# Patient Record
Sex: FEMALE | Race: OTHER
[De-identification: names, ages, dates, MRNs, and addresses within clinical notes are randomized per-mention and may not be internally consistent; named-entity substitution may affect disease eponyms.]

---

## 2018-07-22 ENCOUNTER — HOSPITAL ENCOUNTER (INPATIENT)
Dept: HOSPITAL 72 - EMR | Age: 73
LOS: 2 days | Discharge: HOME | DRG: 305 | End: 2018-07-24
Payer: MEDICARE

## 2018-07-22 VITALS — DIASTOLIC BLOOD PRESSURE: 69 MMHG | SYSTOLIC BLOOD PRESSURE: 165 MMHG

## 2018-07-22 VITALS — DIASTOLIC BLOOD PRESSURE: 72 MMHG | SYSTOLIC BLOOD PRESSURE: 136 MMHG

## 2018-07-22 VITALS — BODY MASS INDEX: 25.76 KG/M2 | HEIGHT: 62 IN | WEIGHT: 140 LBS

## 2018-07-22 VITALS — SYSTOLIC BLOOD PRESSURE: 147 MMHG | DIASTOLIC BLOOD PRESSURE: 69 MMHG

## 2018-07-22 DIAGNOSIS — M25.512: ICD-10-CM

## 2018-07-22 DIAGNOSIS — R07.9: ICD-10-CM

## 2018-07-22 DIAGNOSIS — Z91.19: ICD-10-CM

## 2018-07-22 DIAGNOSIS — N39.0: ICD-10-CM

## 2018-07-22 DIAGNOSIS — M48.02: ICD-10-CM

## 2018-07-22 DIAGNOSIS — I16.0: Primary | ICD-10-CM

## 2018-07-22 LAB
ADD MANUAL DIFF: NO
ALBUMIN SERPL-MCNC: 4.1 G/DL (ref 3.4–5)
ALBUMIN/GLOB SERPL: 1 {RATIO} (ref 1–2.7)
ALP SERPL-CCNC: 74 U/L (ref 46–116)
ALT SERPL-CCNC: 25 U/L (ref 12–78)
ANION GAP SERPL CALC-SCNC: 15 MMOL/L (ref 5–15)
APPEARANCE UR: CLEAR
APTT PPP: (no result) S
AST SERPL-CCNC: 27 U/L (ref 15–37)
BASOPHILS NFR BLD AUTO: 0.8 % (ref 0–2)
BILIRUB SERPL-MCNC: 0.5 MG/DL (ref 0.2–1)
BUN SERPL-MCNC: 14 MG/DL (ref 7–18)
CALCIUM SERPL-MCNC: 9.2 MG/DL (ref 8.5–10.1)
CHLORIDE SERPL-SCNC: 102 MMOL/L (ref 98–107)
CK MB SERPL-MCNC: 1.3 NG/ML (ref 0–3.6)
CK SERPL-CCNC: 147 U/L (ref 26–308)
CO2 SERPL-SCNC: 19 MMOL/L (ref 21–32)
CREAT SERPL-MCNC: 1 MG/DL (ref 0.55–1.3)
EOSINOPHIL NFR BLD AUTO: 0.2 % (ref 0–3)
ERYTHROCYTE [DISTWIDTH] IN BLOOD BY AUTOMATED COUNT: 10.8 % (ref 11.6–14.8)
GLOBULIN SER-MCNC: 4 G/DL
GLUCOSE UR STRIP-MCNC: NEGATIVE MG/DL
HCT VFR BLD CALC: 41.3 % (ref 37–47)
HGB BLD-MCNC: 13.8 G/DL (ref 12–16)
KETONES UR QL STRIP: NEGATIVE
LEUKOCYTE ESTERASE UR QL STRIP: (no result)
LYMPHOCYTES NFR BLD AUTO: 18.1 % (ref 20–45)
MCV RBC AUTO: 92 FL (ref 80–99)
MONOCYTES NFR BLD AUTO: 5.4 % (ref 1–10)
NEUTROPHILS NFR BLD AUTO: 75.5 % (ref 45–75)
NITRITE UR QL STRIP: NEGATIVE
PH UR STRIP: 7 [PH] (ref 4.5–8)
PLATELET # BLD: 247 K/UL (ref 150–450)
POTASSIUM SERPL-SCNC: 3.8 MMOL/L (ref 3.5–5.1)
PROT UR QL STRIP: (no result)
RBC # BLD AUTO: 4.51 M/UL (ref 4.2–5.4)
SODIUM SERPL-SCNC: 136 MMOL/L (ref 136–145)
SP GR UR STRIP: 1.01 (ref 1–1.03)
UROBILINOGEN UR-MCNC: NORMAL MG/DL (ref 0–1)
WBC # BLD AUTO: 10.8 K/UL (ref 4.8–10.8)

## 2018-07-22 PROCEDURE — 82550 ASSAY OF CK (CPK): CPT

## 2018-07-22 PROCEDURE — 80061 LIPID PANEL: CPT

## 2018-07-22 PROCEDURE — 86140 C-REACTIVE PROTEIN: CPT

## 2018-07-22 PROCEDURE — 84439 ASSAY OF FREE THYROXINE: CPT

## 2018-07-22 PROCEDURE — 71045 X-RAY EXAM CHEST 1 VIEW: CPT

## 2018-07-22 PROCEDURE — 93880 EXTRACRANIAL BILAT STUDY: CPT

## 2018-07-22 PROCEDURE — 84484 ASSAY OF TROPONIN QUANT: CPT

## 2018-07-22 PROCEDURE — 81003 URINALYSIS AUTO W/O SCOPE: CPT

## 2018-07-22 PROCEDURE — 85730 THROMBOPLASTIN TIME PARTIAL: CPT

## 2018-07-22 PROCEDURE — 85025 COMPLETE CBC W/AUTO DIFF WBC: CPT

## 2018-07-22 PROCEDURE — 94664 DEMO&/EVAL PT USE INHALER: CPT

## 2018-07-22 PROCEDURE — 82553 CREATINE MB FRACTION: CPT

## 2018-07-22 PROCEDURE — 93306 TTE W/DOPPLER COMPLETE: CPT

## 2018-07-22 PROCEDURE — 36415 COLL VENOUS BLD VENIPUNCTURE: CPT

## 2018-07-22 PROCEDURE — 80053 COMPREHEN METABOLIC PANEL: CPT

## 2018-07-22 PROCEDURE — 72141 MRI NECK SPINE W/O DYE: CPT

## 2018-07-22 PROCEDURE — 84443 ASSAY THYROID STIM HORMONE: CPT

## 2018-07-22 PROCEDURE — 85610 PROTHROMBIN TIME: CPT

## 2018-07-22 PROCEDURE — 93005 ELECTROCARDIOGRAM TRACING: CPT

## 2018-07-22 RX ADMIN — HEPARIN SODIUM SCH UNITS: 5000 INJECTION INTRAVENOUS; SUBCUTANEOUS at 20:52

## 2018-07-22 NOTE — EMERGENCY ROOM REPORT
History of Present Illness


General


Chief Complaint:  General Complaint


Source:  Patient





Present Illness


HPI


Patient present with complaints of left upper chest pain and shoulder pain


Ongoing for the past 2 days





Denies any vomiting or diarrhea patient also reports that she is taking a lot 

of medications for pain and high blood pressure





Denies any recent travel denies any pleurisy or shortness of breath denies any 

back or flank pain





Denies any recent trauma pain is a sharp pain at times aching


Allergies:  


Coded Allergies:  


     No Known Allergies (Unverified , 7/22/18)





Patient History


Past Medical History:  see triage record


Pertinent Family History:  none


Reviewed Nursing Documentation:  PMH: Agreed; PSxH: Agreed





Nursing Documentation-PMH


Hx Hypertension:  Yes - high cholesterol





Review of Systems


All Other Systems:  negative except mentioned in HPI





Physical Exam





Vital Signs








  Date Time  Temp Pulse Resp B/P (MAP) Pulse Ox O2 Delivery O2 Flow Rate FiO2


 


7/22/18 13:36 98.3 84 18 136/72 98 Room Air  





 98.2       








Sp02 EP Interpretation:  reviewed, normal


General Appearance:  well appearing, no apparent distress


Head:  normocephalic, atraumatic


Eyes:  bilateral eye PERRL, bilateral eye EOMI


ENT:  hearing grossly normal, normal pharynx, TMs + canals normal, uvula midline


Neck:  full range of motion, supple, no meningismus, no bony tend


Respiratory:  lungs clear, normal breath sounds, no rhonchi, no respiratory 

distress, no retraction, no accessory muscle use


Cardiovascular #1:  normal peripheral pulses, regular rate, rhythm, no edema, 

no gallop, no JVD, no murmur


Gastrointestinal:  normal bowel sounds, non tender, soft, no mass, no 

organomegaly, non-distended, no guarding, no hernia, no pulsatile mass, no 

rebound


Genitourinary:  no CVA tenderness


Musculoskeletal:  other - Patient does have some discomfort at the left 

shoulder on flexion of the left arm, eversion of the upper arm


Neurologic:  oriented x3, responsive, CNs III-XII nml as tested, motor strength/

tone normal, sensory intact


Psychiatric:  mood/affect normal


Skin:  normal color, no rash, warm/dry, palpation normal


Lymphatic:  normal inspection, no adenopathy





Medical Decision Making


Diagnostic Impression:  


 Primary Impression:  


 ACS (acute coronary syndrome)


 Additional Impression:  


 Hypertensive urgency, malignant


ER Course


Patient is a fairly complex patient with multiple differential to consideration 

including but not limited to cardiac cardiopulmonary and vascular emergencies





Patient's initial EKG along with blood work are at baseline levels





Patient's pain has improved somewhat however given the location of the 

radiation there is concern for possible ACS and patient amended for further 

inpatient care





Labs








Test


  7/22/18


14:00 7/22/18


14:15 7/22/18


18:40 7/23/18


06:40


 


White Blood Count


  10.8 K/UL


(4.8-10.8) 


  


  7.4 K/UL


(4.8-10.8)


 


Red Blood Count


  4.51 M/UL


(4.20-5.40) 


  


  4.54 M/UL


(4.20-5.40)


 


Hemoglobin


  13.8 G/DL


(12.0-16.0) 


  


  14.2 G/DL


(12.0-16.0)


 


Hematocrit


  41.3 %


(37.0-47.0) 


  


  40.8 %


(37.0-47.0)


 


Mean Corpuscular Volume 92 FL (80-99)    90 FL (80-99) 


 


Mean Corpuscular Hemoglobin


  30.5 PG


(27.0-31.0) 


  


  31.3 PG


(27.0-31.0)


 


Mean Corpuscular Hemoglobin


Concent 33.3 G/DL


(32.0-36.0) 


  


  34.9 G/DL


(32.0-36.0)


 


Red Cell Distribution Width


  10.8 %


(11.6-14.8) 


  


  10.7 %


(11.6-14.8)


 


Platelet Count


  247 K/UL


(150-450) 


  


  241 K/UL


(150-450)


 


Mean Platelet Volume


  7.1 FL


(6.5-10.1) 


  


  6.7 FL


(6.5-10.1)


 


Neutrophils (%) (Auto)


  75.5 %


(45.0-75.0) 


  


  62.3 %


(45.0-75.0)


 


Lymphocytes (%) (Auto)


  18.1 %


(20.0-45.0) 


  


  29.8 %


(20.0-45.0)


 


Monocytes (%) (Auto)


  5.4 %


(1.0-10.0) 


  


  6.7 %


(1.0-10.0)


 


Eosinophils (%) (Auto)


  0.2 %


(0.0-3.0) 


  


  0.6 %


(0.0-3.0)


 


Basophils (%) (Auto)


  0.8 %


(0.0-2.0) 


  


  0.7 %


(0.0-2.0)


 


Sodium Level


  136 MMOL/L


(136-145) 


  


  


 


 


Potassium Level


  3.8 MMOL/L


(3.5-5.1) 


  


  


 


 


Chloride Level


  102 MMOL/L


() 


  


  


 


 


Carbon Dioxide Level


  19 MMOL/L


(21-32) 


  


  


 


 


Anion Gap


  15 mmol/L


(5-15) 


  


  


 


 


Blood Urea Nitrogen


  14 mg/dL


(7-18) 


  


  


 


 


Creatinine


  1.0 MG/DL


(0.55-1.30) 


  


  


 


 


Estimat Glomerular Filtration


Rate 54.8 mL/min


(>60) 


  


  


 


 


Glucose Level


  202 MG/DL


() 


  


  


 


 


Calcium Level


  9.2 MG/DL


(8.5-10.1) 


  


  


 


 


Total Bilirubin


  0.5 MG/DL


(0.2-1.0) 


  


  


 


 


Aspartate Amino Transf


(AST/SGOT) 27 U/L (15-37) 


  


  


  


 


 


Alanine Aminotransferase


(ALT/SGPT) 25 U/L (12-78) 


  


  


  


 


 


Alkaline Phosphatase


  74 U/L


() 


  


  


 


 


Total Creatine Kinase


  147 U/L


() 


  


  


 


 


Creatine Kinase MB


  1.3 NG/ML


(0.0-3.6) 


  


  


 


 


Creatine Kinase MB Relative


Index 0.8 


  


  


  


 


 


Troponin I


  0.000 ng/mL


(0.000-0.056) 


  0.010 ng/mL


(0.000-0.056) 0.000 ng/mL


(0.000-0.056)


 


Total Protein


  8.1 G/DL


(6.4-8.2) 


  


  


 


 


Albumin


  4.1 G/DL


(3.4-5.0) 


  


  


 


 


Globulin 4.0 g/dL    


 


Albumin/Globulin Ratio 1.0 (1.0-2.7)    


 


Thyroid Stimulating Hormone


(TSH) 1.488 uiU/mL


(0.358-3.740) 


  


  1.231 uiU/mL


(0.358-3.740)


 


Free Thyroxine


  1.18 NG/DL


(0.76-1.46) 


  


  


 


 


Urine Color  Pale yellow   


 


Urine Appearance  Clear   


 


Urine pH  7 (4.5-8.0)   


 


Urine Specific Gravity


  


  1.010


(1.005-1.035) 


  


 


 


Urine Protein  1+ (NEGATIVE)   


 


Urine Glucose (UA)


  


  Negative


(NEGATIVE) 


  


 


 


Urine Ketones


  


  Negative


(NEGATIVE) 


  


 


 


Urine Occult Blood  2+ (NEGATIVE)   


 


Urine Nitrite


  


  Negative


(NEGATIVE) 


  


 


 


Urine Bilirubin


  


  Negative


(NEGATIVE) 


  


 


 


Urine Urobilinogen


  


  Normal MG/DL


(0.0-1.0) 


  


 


 


Urine Leukocyte Esterase  3+ (NEGATIVE)   


 


Urine RBC


  


  2-4 /HPF (0 -


2) 


  


 


 


Urine WBC


  


  5-10 /HPF (0 -


2) 


  


 


 


Urine Squamous Epithelial


Cells 


  Few /LPF


(NONE/OCC) 


  


 


 


Urine Bacteria


  


  Few /HPF


(NONE) 


  


 


 


Prothrombin Time


  


  


  


  11.0 SEC


(9.30-11.50)


 


Prothromb Time International


Ratio 


  


  


  1.0 (0.9-1.1) 


 


 


Activated Partial


Thromboplast Time 


  


  


  29 SEC (23-33) 


 


 


C-Reactive Protein,


Quantitative 


  


  


  < 0.4 mg/dL


(0.00-0.90)


 


Triglycerides Level


  


  


  


  73 MG/DL


()


 


Cholesterol Level


  


  


  


  171 MG/DL (<


200)


 


LDL Cholesterol


  


  


  


  104 mg/dL


(<100)


 


HDL Cholesterol


  


  


  


  67 MG/DL


(40-60)


 


Cholesterol/HDL Ratio    2.6 (3.3-4.4) 








EKG Diagnostic Results


Rate:  normal


Rhythm:  NSR


ST Segments:  no acute changes





Rhythm Strip Diag. Results


EP Interpretation:  yes


Rate:  67


Rhythm:  NSR, no PVC's, no ectopy





Chest X-Ray Diagnostic Results


Chest X-Ray Diagnostic Results :  


   Chest X-Ray Ordered:  Yes


   # of Views/Limited/Complete:  1 View


   Indication:  Chest Pain


   EP Interpretation:  Yes


   Interpretation:  no consolidation, no effusion, no pneumothorax


   Impression:  No acute disease


   Electronically Signed by:  DO Tyree Quick Vital Signs








  Date Time  Temp Pulse Resp B/P (MAP) Pulse Ox O2 Delivery O2 Flow Rate FiO2


 


7/22/18 15:34 98.2 83 17 165/69 98 Room Air  





 98.2       








Status:  improved


Disposition:  ADMITTED AS INPATIENT


Condition:  Serious


Referrals:  


NON PHYSICIAN (PCP)











Brandy Tillman DO Jul 22, 2018 16:00

## 2018-07-23 VITALS — SYSTOLIC BLOOD PRESSURE: 150 MMHG | DIASTOLIC BLOOD PRESSURE: 70 MMHG

## 2018-07-23 VITALS — DIASTOLIC BLOOD PRESSURE: 63 MMHG | SYSTOLIC BLOOD PRESSURE: 125 MMHG

## 2018-07-23 VITALS — SYSTOLIC BLOOD PRESSURE: 170 MMHG | DIASTOLIC BLOOD PRESSURE: 77 MMHG

## 2018-07-23 VITALS — SYSTOLIC BLOOD PRESSURE: 128 MMHG | DIASTOLIC BLOOD PRESSURE: 70 MMHG

## 2018-07-23 LAB
ADD MANUAL DIFF: NO
APTT BLD: 29 SEC (ref 23–33)
BASOPHILS NFR BLD AUTO: 0.7 % (ref 0–2)
CHOLEST SERPL-MCNC: 171 MG/DL (ref ?–200)
EOSINOPHIL NFR BLD AUTO: 0.6 % (ref 0–3)
ERYTHROCYTE [DISTWIDTH] IN BLOOD BY AUTOMATED COUNT: 10.7 % (ref 11.6–14.8)
HCT VFR BLD CALC: 40.8 % (ref 37–47)
HDLC SERPL-MCNC: 67 MG/DL (ref 40–60)
HGB BLD-MCNC: 14.2 G/DL (ref 12–16)
INR PPP: 1 (ref 0.9–1.1)
LYMPHOCYTES NFR BLD AUTO: 29.8 % (ref 20–45)
MCV RBC AUTO: 90 FL (ref 80–99)
MONOCYTES NFR BLD AUTO: 6.7 % (ref 1–10)
NEUTROPHILS NFR BLD AUTO: 62.3 % (ref 45–75)
PLATELET # BLD: 241 K/UL (ref 150–450)
RBC # BLD AUTO: 4.54 M/UL (ref 4.2–5.4)
TRIGL SERPL-MCNC: 73 MG/DL (ref 30–150)
WBC # BLD AUTO: 7.4 K/UL (ref 4.8–10.8)

## 2018-07-23 RX ADMIN — HEPARIN SODIUM SCH UNITS: 5000 INJECTION INTRAVENOUS; SUBCUTANEOUS at 20:15

## 2018-07-23 RX ADMIN — ENALAPRILAT PRN MG: 1.25 INJECTION, SOLUTION INTRAVENOUS at 21:08

## 2018-07-23 RX ADMIN — HEPARIN SODIUM SCH UNITS: 5000 INJECTION INTRAVENOUS; SUBCUTANEOUS at 09:31

## 2018-07-23 NOTE — CONSULTATION
History of Present Illness


General


Date patient seen:  Jul 23, 2018


Chief Complaint:  General Complaint





Present Illness


HPI


72 year old female with hx of HTN, was brought in by paramedics because of 

dizziness and anxiety, Apparently she tool her morning BP meds twice and got 

anxious. then she had dry mouth and dizzy and called the paramedics. She is 

admitted to telemetry. Her major concern is her Left shoulder pain.


Allergies:  


Coded Allergies:  


     No Known Allergies (Unverified , 7/22/18)





Medication History


Scheduled


Aspirin* (Aspir 81*), 81 MG ORAL DAILY, (Reported)


Diclofenac Sod* (Voltaren*), 50 MG ORAL THREE TIMES A DAY, (Reported)


Hydralazine Hcl* (Hydralazine Hcl*), 10 MG ORAL EVERY 8 HOURS, (Reported)


Levothyroxine Sodium* (Levothyroxine Sodium*), 250 MCG ORAL DAILY, (Reported)


Nifedipine (Nifedipine*), 60 MG ORAL DAILY, (Reported)


Omeprazole Magnesium (Prilosec Otc), 40 MG ORAL DAILY, (Reported)


Oxybutynin Chloride (Oxybutynin Chloride Er), 5 MG PO DAILY, (Reported)





Patient History


Healthcare decision maker





Resuscitation status


Full Code


Advanced Directive on File








Past Medical/Surgical History


Past Medical/Surgical History:  


(1) Hypertension





Review of Systems


All Other Systems:  negative except mentioned in HPI





Physical Exam


General Appearance:  WD/WN, no apparent distress


Lines, tubes and drains:  peripheral, central line, endotracheal tube


HEENT:  normocephalic, atraumatic


Neck:  non-tender, supple


Respiratory/Chest:  chest wall non-tender, normal breath sounds


Cardiovascular/Chest:  normal peripheral pulses, normal rate





Last 24 Hour Vital Signs








  Date Time  Temp Pulse Resp B/P (MAP) Pulse Ox O2 Delivery O2 Flow Rate FiO2


 


7/23/18 09:00      Room Air  


 


7/23/18 08:00  68      


 


7/23/18 08:00 97.8 70 18 125/63 (83) 96   





 97.8       


 


7/23/18 04:00  59      


 


7/22/18 21:00      Room Air  


 


7/22/18 20:00  68      


 


7/22/18 16:50      Room Air  


 


7/22/18 16:45 98.2 92 22 147/69 98 Room Air  





 98.2       


 


7/22/18 16:32 98.2 92 22 147/69 98 Room Air  





 98.2       


 


7/22/18 15:34 98.2 83 17 165/69 98 Room Air  





 98.2       


 


7/22/18 13:46 98.2 100 18 136/72 98 Room Air  





 98.2       


 


7/22/18 13:36 98.3 84 18 136/72 98 Room Air  





 98.2       

















Intake and Output  


 


 7/22/18 7/23/18





 19:00 07:00


 


Intake Total  250 ml


 


Output Total 0 ml 


 


Balance 0 ml 250 ml


 


  


 


Intake Oral  250 ml


 


Output Urine Total 0 ml 


 


# Voids 2 3











Laboratory Tests








Test


  7/22/18


14:00 7/22/18


14:15 7/22/18


18:40 7/23/18


06:40


 


White Blood Count


  10.8 K/UL


(4.8-10.8) 


  


  7.4 K/UL


(4.8-10.8)


 


Red Blood Count


  4.51 M/UL


(4.20-5.40) 


  


  4.54 M/UL


(4.20-5.40)


 


Hemoglobin


  13.8 G/DL


(12.0-16.0) 


  


  14.2 G/DL


(12.0-16.0)


 


Hematocrit


  41.3 %


(37.0-47.0) 


  


  40.8 %


(37.0-47.0)


 


Mean Corpuscular Volume 92 FL (80-99)     90 FL (80-99)  


 


Mean Corpuscular Hemoglobin


  30.5 PG


(27.0-31.0) 


  


  31.3 PG


(27.0-31.0)  H


 


Mean Corpuscular Hemoglobin


Concent 33.3 G/DL


(32.0-36.0) 


  


  34.9 G/DL


(32.0-36.0)


 


Red Cell Distribution Width


  10.8 %


(11.6-14.8)  L 


  


  10.7 %


(11.6-14.8)  L


 


Platelet Count


  247 K/UL


(150-450) 


  


  241 K/UL


(150-450)


 


Mean Platelet Volume


  7.1 FL


(6.5-10.1) 


  


  6.7 FL


(6.5-10.1)


 


Neutrophils (%) (Auto)


  75.5 %


(45.0-75.0)  H 


  


  62.3 %


(45.0-75.0)


 


Lymphocytes (%) (Auto)


  18.1 %


(20.0-45.0)  L 


  


  29.8 %


(20.0-45.0)


 


Monocytes (%) (Auto)


  5.4 %


(1.0-10.0) 


  


  6.7 %


(1.0-10.0)


 


Eosinophils (%) (Auto)


  0.2 %


(0.0-3.0) 


  


  0.6 %


(0.0-3.0)


 


Basophils (%) (Auto)


  0.8 %


(0.0-2.0) 


  


  0.7 %


(0.0-2.0)


 


Sodium Level


  136 MMOL/L


(136-145) 


  


  


 


 


Potassium Level


  3.8 MMOL/L


(3.5-5.1) 


  


  


 


 


Chloride Level


  102 MMOL/L


() 


  


  


 


 


Carbon Dioxide Level


  19 MMOL/L


(21-32)  L 


  


  


 


 


Anion Gap


  15 mmol/L


(5-15) 


  


  


 


 


Blood Urea Nitrogen


  14 mg/dL


(7-18) 


  


  


 


 


Creatinine


  1.0 MG/DL


(0.55-1.30) 


  


  


 


 


Estimat Glomerular Filtration


Rate 54.8 mL/min


(>60) 


  


  


 


 


Glucose Level


  202 MG/DL


()  H 


  


  


 


 


Calcium Level


  9.2 MG/DL


(8.5-10.1) 


  


  


 


 


Total Bilirubin


  0.5 MG/DL


(0.2-1.0) 


  


  


 


 


Aspartate Amino Transf


(AST/SGOT) 27 U/L (15-37)


  


  


  


 


 


Alanine Aminotransferase


(ALT/SGPT) 25 U/L (12-78)


  


  


  


 


 


Alkaline Phosphatase


  74 U/L


() 


  


  


 


 


Total Creatine Kinase


  147 U/L


() 


  


  


 


 


Creatine Kinase MB


  1.3 NG/ML


(0.0-3.6) 


  


  


 


 


Creatine Kinase MB Relative


Index 0.8  


  


  


  


 


 


Troponin I


  0.000 ng/mL


(0.000-0.056) 


  0.010 ng/mL


(0.000-0.056) 0.000 ng/mL


(0.000-0.056)


 


Total Protein


  8.1 G/DL


(6.4-8.2) 


  


  


 


 


Albumin


  4.1 G/DL


(3.4-5.0) 


  


  


 


 


Globulin 4.0 g/dL     


 


Albumin/Globulin Ratio 1.0 (1.0-2.7)     


 


Thyroid Stimulating Hormone


(TSH) 1.488 uiU/mL


(0.358-3.740) 


  


  1.231 uiU/mL


(0.358-3.740)


 


Free Thyroxine


  1.18 NG/DL


(0.76-1.46) 


  


  


 


 


Urine Color  Pale yellow    


 


Urine Appearance  Clear    


 


Urine pH  7 (4.5-8.0)    


 


Urine Specific Gravity


  


  1.010


(1.005-1.035) 


  


 


 


Urine Protein


  


  1+ (NEGATIVE)


H 


  


 


 


Urine Glucose (UA)


  


  Negative


(NEGATIVE) 


  


 


 


Urine Ketones


  


  Negative


(NEGATIVE) 


  


 


 


Urine Occult Blood


  


  2+ (NEGATIVE)


H 


  


 


 


Urine Nitrite


  


  Negative


(NEGATIVE) 


  


 


 


Urine Bilirubin


  


  Negative


(NEGATIVE) 


  


 


 


Urine Urobilinogen


  


  Normal MG/DL


(0.0-1.0) 


  


 


 


Urine Leukocyte Esterase


  


  3+ (NEGATIVE)


H 


  


 


 


Urine RBC


  


  2-4 /HPF (0 -


2)  H 


  


 


 


Urine WBC


  


  5-10 /HPF (0 -


2)  H 


  


 


 


Urine Squamous Epithelial


Cells 


  Few /LPF


(NONE/OCC) 


  


 


 


Urine Bacteria


  


  Few /HPF


(NONE) 


  


 


 


Prothrombin Time


  


  


  


  11.0 SEC


(9.30-11.50)


 


Prothromb Time International


Ratio 


  


  


  1.0 (0.9-1.1)  


 


 


Activated Partial


Thromboplast Time 


  


  


  29 SEC (23-33)


 


 


C-Reactive Protein,


Quantitative 


  


  


  < 0.4 mg/dL


(0.00-0.90)


 


Triglycerides Level


  


  


  


  73 MG/DL


()


 


Cholesterol Level


  


  


  


  171 MG/DL (<


200)


 


LDL Cholesterol


  


  


  


  104 mg/dL


(<100)  H


 


HDL Cholesterol


  


  


  


  67 MG/DL


(40-60)  H


 


Cholesterol/HDL Ratio


  


  


  


  2.6 (3.3-4.4)


L








Height (Feet):  5


Height (Inches):  2.00


Weight (Pounds):  140


Medications





Current Medications








 Medications


  (Trade)  Dose


 Ordered  Sig/Chely


 Route


 PRN Reason  Start Time


 Stop Time Status Last Admin


Dose Admin


 


 Acetaminophen


  (Tylenol)  650 mg  Q4H  PRN


 ORAL


 FEVER  7/22/18 18:00


 8/21/18 17:59  7/23/18 09:29


 


 


 Albuterol/


 Ipratropium


  (Albuterol/


 Ipratropium)  3 ml  Q4H  PRN


 HHN


 Shortness of Breath  7/22/18 18:00


 7/27/18 17:59   


 


 


 Diltiazem HCl


  (Cardizem)  10 mg  Q1H  PRN


 IV


 heart rate more than 120  7/22/18 18:00


 8/21/18 17:59   


 


 


 Enalaprilat


  (Vasotec)  2.5 mg  Q6H  PRN


 IV


 sbp more than 160  7/22/18 18:00


 8/21/18 17:59   


 


 


 Heparin Sodium


  (Porcine)


  (Heparin 5000


 units/ml)  5,000 units  EVERY 12  HOURS


 SUBQ


   7/22/18 21:00


 8/21/18 20:59  7/23/18 09:31


 


 


 Labetalol HCl


  (Normodyne)  20 mg  Q1H  PRN


 IV


 sbp more than 160  7/22/18 18:00


 8/21/18 17:59   


 


 


 Nitroglycerin


  (Ntg)  0.4 mg  Q5M  PRN


 SL


 Prn Chest Pain  7/22/18 18:00


 8/21/18 17:59   


 


 


 Ondansetron HCl


  (Zofran)  4 mg  Q6H  PRN


 IVP


 Nausea & Vomiting  7/22/18 18:00


 8/21/18 17:59   


 


 


 Pantoprazole


  (Protonix)  40 mg  DAILY


 ORAL


   7/23/18 09:00


 8/22/18 08:59  7/23/18 10:20


 


 


 Polyethylene


 Glycol


  (Miralax)  17 gm  DAILYPRN  PRN


 ORAL


 Constipation  7/22/18 18:00


 8/21/18 17:59   


 


 


 Temazepam


  (Restoril)  15 mg  HSPRN  PRN


 ORAL


 Insomnia  7/22/18 21:00


 7/29/18 20:59   


 











Assessment/Plan


Problem List:  


(1) Dizziness


ICD Codes:  R42 - Dizziness and giddiness


SNOMED:  741163588, 279292201


(2) Shoulder pain


ICD Codes:  M25.519 - Pain in unspecified shoulder


SNOMED:  01818578


(3) Hypertension


ICD Codes:  I10 - Essential (primary) hypertension


SNOMED:  84597651


(4) Noncompliance


ICD Codes:  Z91.19 - Patient's noncompliance with other medical treatment and 

regimen


SNOMED:  6429258


Assessment/Plan


monitor in telemetry


echo


ortho evaluation


symptomatic evaluation


doppler of carotid artery


dvt prophylaxis.











Magdy Chambers MD Jul 23, 2018 12:09

## 2018-07-23 NOTE — DIAGNOSTIC IMAGING REPORT
Indication: Pain

 

Technique: XRAY Shoulder Compl L

 

Comparison: None

 

Findings: No evidence of acute fracture or dislocation. Imaged portions of the left

lung clear. No radiopaque foreign body identified.

 

Impression: No acute fracture or dislocation. 

 

Consider MRI of the left shoulder on a nonemergent basis as clinically indicated if

there is continued shoulder pain.

## 2018-07-23 NOTE — CARDIOLOGY REPORT
--------------- APPROVED REPORT --------------





EXAM: Two-dimensional and M-mode echocardiogram with Doppler and color 

Doppler.



INDICATION

LV FUNCTION 



M-Mode DIMENSIONS 

IVSd1.2 (0.7-1.1cm)Left Atrium (MM)3.1 (1.6-4.0cm)

LVDd5.4 (3.5-5.6cm)Aortic Root2.3 (2.0-3.7cm)

PWd1.3 (0.7-1.1cm)Aortic Cusp Exc.1.7 (1.5-2.0cm)

IVSs1.7 cm

LVDs3.4 (2.5-4.0cm)

PWs1.2 cm





Technically difficult study due to poor acoustical windows.

Normal left ventricular chamber size, systolic function and wall motion to extent 

visualized.

Left ventricular ejection fraction estimated to be  55-60 %.

No evidence of left ventricular hypertrophy .

No evidence of  pericardial effusion. 

All other cardiac chamber sizes are within normal limits. 

Focal aortic valve sclerosis with adequate cusp excursion.

Thickened mitral valve leaflets with normal excursion.

Mitral annulus and aortic root calcification.

Pulmonic valve not well visualized.

Normal tricuspid valve structure. 

IVC at normal size with physiologic collapse .



A  color flow and spectral Doppler study was performed and revealed:

Mild aortic regurgitation.

Trace  mitral regurgitation.

Mitral diastolic velocities suggest reduced left ventricular relaxation c/w mild LV 

diastolic dysfunction (Grade I ). 

Mild tricuspid regurgitation.

Tricuspid  systolic velocities suggests peak right ventricular systolic pressure of  

34mmH.

No Pulmonic regurgitation present.

## 2018-07-23 NOTE — DIAGNOSTIC IMAGING REPORT
Indication: Chest pain

 

Technique: XRAY Chest 1v

 

Comparison: None

 

Findings: Cardiac leads overlie the chest. Heart size and mediastinal contours are

within normal limits given technique. There is no focal consolidation, pneumothorax

or pleural effusion. Degenerative change of the spine. Osseous structures demonstrate

no acute abnormality.

 

Impression: No radiographic evidence of acute cardiopulmonary disease.

## 2018-07-23 NOTE — HISTORY AND PHYSICAL REPORT
DATE OF ADMISSION:  07/22/2018



CONSULTANTS:

1. Dr. Crews.

2. Magdy Chambers M.D.

3. Carlos Campbell M.D.



CHIEF COMPLAINT:  Headache, hypertensive urgency, and left shoulder pain.



BRIEF HISTORY:  The patient is a 72-year-old female, who lives at home,

presents with headache, slight weakness, also left shoulder pain, sent to

[place], diagnosed with hypertensive urgency, admitted to telemetry for

further care.  Currently calm in bed.  No complaint.



PAST MEDICAL HISTORY:  Hypertension.



PAST SURGICAL HISTORY:  None.



MEDICATIONS:  Protonix, Restoril, heparin, albuterol, _____, MiraLAX,

Zofran, Vasotec, Cardizem, and Normodyne.



ALLERGIES:  Denies.



SOCIAL HISTORY:  No smoking.  No alcohol.  No intravenous drug

abuse.



REVIEW OF SYSTEMS:  No chest pain.  Slight short of breath.  Slight nausea.

No vomiting or diarrhea.



PHYSICAL EXAMINATION:

GENERAL:  Calm in bed, oriented x3, no acute distress.

VITAL SIGNS:  Temperature is 97 degrees, pulse 70, respiratory rate 16, and

blood pressure now is 123/63.

CARDIOVASCULAR:  No murmur.

LUNGS:  Distant and clear.

ABDOMEN:  Bowel sounds positive.  Nontender.  Nondistended.

EXTREMITIES:  No cyanosis or edema.

NEUROLOGIC:  The patient moves all extremities, slightly weak.



LABORATORY AND DIAGNOSTIC DATA:  CBC is normal.  BMP actually shows

troponin 0.00, 0.01 and 0.00.  Cholesterol, , and HDL 67, and total

is 73.  INR is 1.0 and PTT is 29.  Urinalysis shows 3+ leukocyte

esterase.



ASSESSMENT:

1. Headache.

2. Hypertensive urgency.

3. Urinary tract infection.

4. Left shoulder pain.



PLAN:

1. Continue previous medications.

2. Blood pressure control.

3. Antibiotics per Infectious Disease.

4. Pain control.

5. Orthopedic and cardiac followup.

6. The patient per insurance will be transferred shortly to assigned

hospital.









  ______________________________________________

  Jose Solano D.O.





DR:  AGUSTIN

D:  07/23/2018 14:17

T:  07/23/2018 21:58

JOB#:  [id number]

CC:

## 2018-07-24 VITALS — SYSTOLIC BLOOD PRESSURE: 148 MMHG | DIASTOLIC BLOOD PRESSURE: 54 MMHG

## 2018-07-24 VITALS — SYSTOLIC BLOOD PRESSURE: 155 MMHG | DIASTOLIC BLOOD PRESSURE: 75 MMHG

## 2018-07-24 VITALS — SYSTOLIC BLOOD PRESSURE: 166 MMHG | DIASTOLIC BLOOD PRESSURE: 67 MMHG

## 2018-07-24 VITALS — DIASTOLIC BLOOD PRESSURE: 72 MMHG | SYSTOLIC BLOOD PRESSURE: 170 MMHG

## 2018-07-24 VITALS — DIASTOLIC BLOOD PRESSURE: 74 MMHG | SYSTOLIC BLOOD PRESSURE: 123 MMHG

## 2018-07-24 VITALS — SYSTOLIC BLOOD PRESSURE: 160 MMHG | DIASTOLIC BLOOD PRESSURE: 74 MMHG

## 2018-07-24 RX ADMIN — HEPARIN SODIUM SCH UNITS: 5000 INJECTION INTRAVENOUS; SUBCUTANEOUS at 09:54

## 2018-07-24 RX ADMIN — ENALAPRILAT PRN MG: 1.25 INJECTION, SOLUTION INTRAVENOUS at 04:38

## 2018-07-24 NOTE — DIAGNOSTIC IMAGING REPORT
Indication: 72-year-old female inpatient with pain in the neck and left shoulder and

arm 

 

Technique: Sagittal T1 FLAIR PROPELLER, sagittal T2 PROPELLOR, sagittal STIR, axial

T2 PROPELLER, axial 3D COSMIC ASPIR images were obtained through the cervical spine

 

Comparison: none

 

Findings: Exam is limited by motion. In particular, the axial T2-weighted images

demonstrate severe motion artifact despite repeat acquisitions. This in particular

limits evaluation of spinal cord signal.

 

The bony alignment is normal. Vertebral body heights are preserved. The vertebral

body marrow signal is normal. The intrinsic cord signal is probably normal although

not well demonstrated as mentioned above

 

There is what appears to be a degenerative subchondral cyst within the tip of the

odontoid. There is some thickening of the posterior atlantoaxial ligament.

 

At C3-4, there is mild degenerative disc narrowing. There is circumferential annular

bulge which results in moderate spinal stenosis. There is probably moderate bilateral

neural foraminal stenosis.

 

At C4-5, there is mild degenerative disc narrowing. There is broad-based posterior

disc protrusion which results in moderate spinal stenosis and slight impingement on

the anterior aspect of the cord. There is moderate right and severe left neural

foraminal stenosis at this level.

 

At C5-6, there is mild degenerative disc narrowing. There is very mild

circumferential annular bulge resulting in borderline narrowing of the spinal canal,

no significant cord impingement. Severe left and mild to moderate right neural

foraminal stenosis at this level.

 

At C6-7, there is minimal circumferential annular bulge which does not significantly

narrow the spinal canal. There is mild to moderate bilateral neural foraminal

stenosis at this level.

 

At the remaining levels, no significant disc bulge or protrusion, spinal stenosis or

neural foraminal stenosis.

 

The included extra spinal soft tissues are unremarkable.

 

Impression: To limit exam, due to motion artifact

 

Moderate spinal stenosis at C3-4 and C4-5, predominantly due to disc bulges at this

these levels

 

Multilevel neural foraminal stenoses, some severe, as detailed on a level by level

basis above

 

Other degenerative changes, as detailed above

## 2018-07-24 NOTE — CONSULTATION
History of Present Illness


General


Date patient seen:  Jul 24, 2018


Time patient seen:  16:18


Chief Complaint:  General Complaint





Present Illness


HPI


72 year old  female, c/o Left  chest and shoulder pain 3/10, precordial

, pressure/sharp. She is SR on monitor at 60s, HR regular. She also has some 

anxiety and she took 2 pain pills and 2 blood pressure medication and started 

panicking because of the doubled dose. Troponin negative x3, blood pressure 

elevated. Echocardiogram Left ventricular ejection fraction estimated to be  55-

60 %.  CT spine Moderate spinal stenosis at C3-4 and C4-5, predominantly due to 

disc bulges at this these levels


Allergies:  


Coded Allergies:  


     No Known Allergies (Unverified , 7/22/18)





Medication History


Scheduled


Aspirin* (Aspir 81*), 81 MG ORAL DAILY, (Reported)


Diclofenac Sod* (Voltaren*), 50 MG ORAL THREE TIMES A DAY, (Reported)


Hydralazine Hcl* (Hydralazine Hcl*), 10 MG ORAL EVERY 8 HOURS, (Reported)


Levothyroxine Sodium* (Levothyroxine Sodium*), 250 MCG ORAL DAILY, (Reported)


Nifedipine (Nifedipine*), 60 MG ORAL DAILY, (Reported)


Omeprazole Magnesium (Prilosec Otc), 40 MG ORAL DAILY, (Reported)


Oxybutynin Chloride (Oxybutynin Chloride Er), 5 MG PO DAILY, (Reported)





Patient History


Healthcare decision maker





Resuscitation status


Full Code


Advanced Directive on File








Review of Systems


Constitutional:  Reports: no symptoms


Eye:  Reports: no symptoms


ENT:  Reports: no symptoms


Respiratory:  Reports: no symptoms


Cardiovascular:  Reports: chest pain


Gastrointestinal:  Reports: no symptoms


Genitourinary:  Reports: no symptoms


Musculoskeletal:  Reports: joint pain, joint swelling


Skin:  Reports: no symptoms


Neurological:  Reports: no symptoms


Endocrine:  Reports: no symptoms


Hematologic/Lymphatic:  Reports: no symptoms





Physical Exam


General Appearance:  no apparent distress


Lines, tubes and drains:  peripheral


HEENT:  normocephalic, atraumatic


Neck:  non-tender, normal alignment


Respiratory/Chest:  chest wall non-tender, lungs clear


Cardiovascular/Chest:  normal peripheral pulses


Abdomen:  normal bowel sounds, non tender


Extremities:  normal range of motion, non-pitting


Skin Exam:  normal pigmentation


Neurologic:  CNs II-XII grossly normal





Last 24 Hour Vital Signs








  Date Time  Temp Pulse Resp B/P (MAP) Pulse Ox O2 Delivery O2 Flow Rate FiO2


 


7/24/18 14:39  67 16   Room Air  21


 


7/24/18 12:00 98.1 53 18 166/67 (100) 98   





 98.1       


 


7/24/18 12:00  55      


 


7/24/18 09:55    155/75    


 


7/24/18 09:51  52  155/75    


 


7/24/18 09:00      Room Air  


 


7/24/18 08:00 97.5 52 18 155/75 (101) 98   





 97.5       


 


7/24/18 08:00  53      


 


7/24/18 04:38    170/72    


 


7/24/18 04:00  55      


 


7/24/18 04:00 98.0 61 16 170/72 (104) 98   





 98.0       


 


7/24/18 00:00 97.9 64 16 123/74 (90) 96   





 97.9       


 


7/24/18 00:00  60      


 


7/23/18 21:08    170/77    


 


7/23/18 21:00      Room Air  


 


7/23/18 20:00  61      


 


7/23/18 20:00 97.9 59 19 170/77 (108) 98   





 97.9       

















Intake and Output  


 


 7/23/18 7/24/18





 19:00 07:00


 


Intake Total 360 ml 480 ml


 


Balance 360 ml 480 ml


 


  


 


Intake Oral 360 ml 480 ml


 


# Voids 3 3








Height (Feet):  5


Height (Inches):  2.00


Weight (Pounds):  140


Medications





Current Medications








 Medications


  (Trade)  Dose


 Ordered  Sig/Chely


 Route


 PRN Reason  Start Time


 Stop Time Status Last Admin


Dose Admin


 


 Acetaminophen


  (Tylenol)  650 mg  Q4H  PRN


 ORAL


 FEVER  7/22/18 18:00


 8/21/18 17:59  7/24/18 04:43


 


 


 Albuterol/


 Ipratropium


  (Albuterol/


 Ipratropium)  3 ml  Q4H  PRN


 HHN


 Shortness of Breath  7/22/18 18:00


 7/27/18 17:59   


 


 


 Diltiazem HCl


  (Cardizem)  10 mg  Q1H  PRN


 IV


 heart rate more than 120  7/22/18 18:00


 8/21/18 17:59   


 


 


 Enalaprilat


  (Vasotec)  2.5 mg  Q6H  PRN


 IV


 sbp more than 160  7/22/18 18:00


 8/21/18 17:59  7/24/18 04:38


 


 


 Heparin Sodium


  (Porcine)


  (Heparin 5000


 units/ml)  5,000 units  EVERY 12  HOURS


 SUBQ


   7/22/18 21:00


 8/21/18 20:59  7/24/18 09:54


 


 


 Hydralazine HCl


  (Apresoline)  10 mg  DAILY


 ORAL


   7/24/18 09:00


 8/23/18 08:59  7/24/18 09:55


 


 


 Labetalol HCl


  (Normodyne)  20 mg  Q1H  PRN


 IV


 sbp more than 160  7/22/18 18:00


 8/21/18 17:59   


 


 


 Nifedipine


  (Procardia XL)  60 mg  DAILY


 ORAL


   7/24/18 09:00


 8/23/18 08:59  7/24/18 09:51


 


 


 Nitroglycerin


  (Ntg)  0.4 mg  Q5M  PRN


 SL


 Prn Chest Pain  7/22/18 18:00


 8/21/18 17:59   


 


 


 Ondansetron HCl


  (Zofran)  4 mg  Q6H  PRN


 IVP


 Nausea & Vomiting  7/22/18 18:00


 8/21/18 17:59   


 


 


 Pantoprazole


  (Protonix)  40 mg  DAILY


 ORAL


   7/23/18 09:00


 8/22/18 08:59  7/24/18 09:51


 


 


 Polyethylene


 Glycol


  (Miralax)  17 gm  DAILYPRN  PRN


 ORAL


 Constipation  7/22/18 18:00


 8/21/18 17:59   


 


 


 Temazepam


  (Restoril)  15 mg  HSPRN  PRN


 ORAL


 Insomnia  7/22/18 21:00


 7/29/18 20:59   


 











Assessment/Plan


Status:  stable


Assessment/Plan


Assessment


(1) Dizziness


(2) Shoulder pain


(3) Hypertension


(4) Noncompliance


(5) Chest pain


(6) Shoulder pain





Plan


ACS ruled out , no cath needed, low pre test probability


Outpatient stress test


daily aspirin


BP control with hydralazine and procardia, would change hydralazine to 

lisinopril 20 mg


MRi shoulder


ok to discharge











Victor Manuel Reyes M.D. Jul 24, 2018 16:31

## 2018-07-24 NOTE — PULMONOLOGY PROGRESS NOTE
Assessment/Plan


Problems:  


(1) Dizziness


(2) Shoulder pain


(3) Hypertension


(4) Noncompliance


Assessment/Plan


BP better controlled


xr of left shoulder not helpful


MRI of Cspine and shoulder ordered


symptomatic treatment


might go home after the MRI





Subjective


ROS Limited/Unobtainable:  No


Interval Events:  c/o neck and left shoulder pain


Allergies:  


Coded Allergies:  


     No Known Allergies (Unverified , 7/22/18)





Objective





Last 24 Hour Vital Signs








  Date Time  Temp Pulse Resp B/P (MAP) Pulse Ox O2 Delivery O2 Flow Rate FiO2


 


7/24/18 09:55    155/75    


 


7/24/18 09:51  52  155/75    


 


7/24/18 08:00 97.5 52 18 155/75 (101) 98   





 97.5       


 


7/24/18 04:38    170/72    


 


7/24/18 04:00  55      


 


7/24/18 04:00 98.0 61 16 170/72 (104) 98   





 98.0       


 


7/24/18 00:00 97.9 64 16 123/74 (90) 96   





 97.9       


 


7/24/18 00:00  60      


 


7/23/18 21:08    170/77    


 


7/23/18 21:00      Room Air  


 


7/23/18 20:00  61      


 


7/23/18 20:00 97.9 59 19 170/77 (108) 98   





 97.9       


 


7/23/18 16:00 97.9 64 18 128/61 (83) 96   





 97.9       


 


7/23/18 16:00  60      


 


7/23/18 12:00  61      


 


7/23/18 12:00 97.9 51 18 150/70 (96) 95   





 97.9       

















Intake and Output  


 


 7/23/18 7/24/18





 19:00 07:00


 


Intake Total 360 ml 480 ml


 


Balance 360 ml 480 ml


 


  


 


Intake Oral 360 ml 480 ml


 


# Voids 3 3








General Appearance:  WD/WN


HEENT:  normocephalic, atraumatic


Respiratory/Chest:  chest wall non-tender, lungs clear


Breasts:  no masses


Cardiovascular:  normal peripheral pulses, normal rate


Abdomen:  normal bowel sounds, soft, non tender


Extremities:  no clubbing


Neurologic/Psychiatric:  CNs II-XII grossly normal





Current Medications








 Medications


  (Trade)  Dose


 Ordered  Sig/Chely


 Route


 PRN Reason  Start Time


 Stop Time Status Last Admin


Dose Admin


 


 Acetaminophen


  (Tylenol)  650 mg  Q4H  PRN


 ORAL


 FEVER  7/22/18 18:00


 8/21/18 17:59  7/24/18 04:43


 


 


 Albuterol/


 Ipratropium


  (Albuterol/


 Ipratropium)  3 ml  Q4H  PRN


 HHN


 Shortness of Breath  7/22/18 18:00


 7/27/18 17:59   


 


 


 Diltiazem HCl


  (Cardizem)  10 mg  Q1H  PRN


 IV


 heart rate more than 120  7/22/18 18:00


 8/21/18 17:59   


 


 


 Enalaprilat


  (Vasotec)  2.5 mg  Q6H  PRN


 IV


 sbp more than 160  7/22/18 18:00


 8/21/18 17:59  7/24/18 04:38


 


 


 Heparin Sodium


  (Porcine)


  (Heparin 5000


 units/ml)  5,000 units  EVERY 12  HOURS


 SUBQ


   7/22/18 21:00


 8/21/18 20:59  7/24/18 09:54


 


 


 Hydralazine HCl


  (Apresoline)  10 mg  DAILY


 ORAL


   7/24/18 09:00


 8/23/18 08:59  7/24/18 09:55


 


 


 Labetalol HCl


  (Normodyne)  20 mg  Q1H  PRN


 IV


 sbp more than 160  7/22/18 18:00


 8/21/18 17:59   


 


 


 Nifedipine


  (Procardia XL)  60 mg  DAILY


 ORAL


   7/24/18 09:00


 8/23/18 08:59  7/24/18 09:51


 


 


 Nitroglycerin


  (Ntg)  0.4 mg  Q5M  PRN


 SL


 Prn Chest Pain  7/22/18 18:00


 8/21/18 17:59   


 


 


 Ondansetron HCl


  (Zofran)  4 mg  Q6H  PRN


 IVP


 Nausea & Vomiting  7/22/18 18:00


 8/21/18 17:59   


 


 


 Pantoprazole


  (Protonix)  40 mg  DAILY


 ORAL


   7/23/18 09:00


 8/22/18 08:59  7/24/18 09:51


 


 


 Polyethylene


 Glycol


  (Miralax)  17 gm  DAILYPRN  PRN


 ORAL


 Constipation  7/22/18 18:00


 8/21/18 17:59   


 


 


 Temazepam


  (Restoril)  15 mg  HSPRN  PRN


 ORAL


 Insomnia  7/22/18 21:00


 7/29/18 20:59   


 

















Magdy Chambers MD Jul 24, 2018 11:03

## 2018-07-25 NOTE — DISCHARGE SUMMARY
Discharge Summary


Discharge Summary


_


DATE OF ADMISSION: 07/22/2018





DATE OF DISCHARGE: 07/24/2018 





REASON FOR ADMISSION: 


72 years old female with past medical history  of hypertension and anxiety,  

presented to emergency department with complaints of left upper chest pain and 

left shoulder  pain for the past few days.  


Pain was ongoing and sharp, at  times aching. 


Admitted to dizziness, nio blackouts, no loss of consciousness. 


No shortness of breath.  


No nausea , diarrhea or vomiting





No back or flank pain 


No recent trauma/injury  to chest wall. 


Upon evaluation  severely elevated blood pressure which was addressed i 

emergency department with some improvement.  


Laboratory workup revealed no leukocytosis, stable hemoglobin and hematocrit.


Troponin negative.  


EKG revealed normal sinus rhythm, no acute ischemic changes .


Chest x-ray revealed no acute cardiopulmonary pathology.


Stable electrolytes and renal parameters.  


Patient admitted with diagnoses of chest pain,  rule out acute coronary syndrome

, hypertensive urgency, left shoulder pain





CONSULTANTS:


cardiologist Dr. Reyes


pulmonary Dr. Chambers


 


[facility] COURSE: 


Patient admitted to telemetry floor.  


Cardiology and pulmonology consults were requested.  


Serial troponin were negative. 


EKG revealed no acute ischemic changes.  


Patient was ruled out for acute MI.  


Echocardiogram revealed preserved ejection fraction 55-60%, no evidence of left 

ventricular hypertrophy.  Normal left ventricular chamber size, systolic 

function and wall motion.  Right ventricular systolic pressure of 34.  


Blood pressure was managed with  hydralazine and Procardia.  


Cardiologist recommended to change hydralazine to lisinopril.  


Lipid panel revealed   LDL   of 104,  otherwise stable total cholesterol and 

triglycerides.  


Patient was educated on low-fat low-cholesterol l cardiac diet.  


Per cardiologist no catheterization was needed at this time.  


Patient had a low pretest probability.  


Cardiologist recommended outpatient stress test.  


Patient started on daily aspirin.


Carotid duplex revealed mild degree of stenosis of  internal carotid artery 

bilaterally.


DVT and GI prophylaxis provided.  


X-ray of the left shoulder revealed no evidence of acute fracture or 

dislocation.  


Pain management was addressed,  and pain was controlled.  


Consider MRI of the left shoulder , which could be done as outpatient.  


MRI of the cervical spine revealed moderate spinal stenosis.


Patient clinically improved,  blood pressure stabilized,  no further chest pain 

.


Patient was stable for discharge home.





FINAL DIAGNOSES: 


Hypertensive urgency


Chest pain, acute coronary syndrome was ruled out


Noncompliance


Left shoulder pain


Dizziness





DISCHARGE MEDICATIONS:


See Medication Reconciliation list.





DISCHARGE INSTRUCTIONS:


Patient was discharged home.  Outpatient follow-up with the primary care 

provider next week





I have been assigned to dictate discharge summary for this account. I was not 

involved in the patient's management.











Maci Butler NP Jul 25, 2018 08:29

## 2018-07-25 NOTE — DIAGNOSTIC IMAGING REPORT
EXAM: MR left shoulder

 

INDICATION:  Left shoulder pain.

 

COMPARISON: No shoulder radiographs from 07/23/18

 

TECHNIQUE: Multiplanar, multiecho imaging is performed of the sides 

shoulder without IV contrast. Total number of images received: 154

 

FINDINGS:

 

Tendons: Supraspinatus is completely torn with 1 cm gap best seen on 

series 9, image 7.  Mild infraspinatus and subscapularis tendinosis.  

Long head of biceps and teres minor tendons are intact..

 

Labrum: grossly intact.

 

Cartilage: articular cartilages are within normal limits.

 

AC joint: mild hypertrophic arthropathy.  The acromion appears slightly 

subluxed from clavicle by about 4 mm, best seen on series 7 image 10 and 

11.

 

Bones: within normal limits.

 

Intra- and extra- articular fluid: Minimal fluid in subacromial bursa

 

Muscles: within normal limits.

 

Vessels: within normal limits.

 

Nerves: within normal limits.

 

IMPRESSION:

 

Supraspinatus is completely torn with 1 cm gap

 

Mild infraspinatus and subscapularis tendinosis.

## 2018-07-25 NOTE — CARDIOLOGY REPORT
--------------- APPROVED REPORT --------------





EKG Measurement

Heart [id number]

OK 140P-14

[id number]

[id number]

[id number]





Sinus bradycardia with sinus arrhythmia

Otherwise normal ECG